# Patient Record
Sex: FEMALE | Race: WHITE | Employment: FULL TIME | URBAN - METROPOLITAN AREA
[De-identification: names, ages, dates, MRNs, and addresses within clinical notes are randomized per-mention and may not be internally consistent; named-entity substitution may affect disease eponyms.]

---

## 2017-06-07 ENCOUNTER — HOSPITAL ENCOUNTER (OUTPATIENT)
Dept: MAMMOGRAPHY | Age: 52
Discharge: HOME | End: 2017-06-07
Attending: INTERNAL MEDICINE
Payer: COMMERCIAL

## 2017-06-07 DIAGNOSIS — Z12.39 SCREENING FOR BREAST CANCER: ICD-10-CM

## 2017-06-07 PROCEDURE — 77063 BREAST TOMOSYNTHESIS BI: CPT | Performed by: INTERNAL MEDICINE

## 2017-06-07 PROCEDURE — 77067 SCR MAMMO BI INCL CAD: CPT | Performed by: INTERNAL MEDICINE

## 2017-10-09 ENCOUNTER — LAB ENCOUNTER (OUTPATIENT)
Dept: LAB | Age: 52
End: 2017-10-09
Attending: INTERNAL MEDICINE
Payer: COMMERCIAL

## 2017-10-09 ENCOUNTER — OFFICE VISIT (OUTPATIENT)
Dept: FAMILY MEDICINE CLINIC | Facility: CLINIC | Age: 52
End: 2017-10-09

## 2017-10-09 VITALS
TEMPERATURE: 98 F | WEIGHT: 128 LBS | RESPIRATION RATE: 16 BRPM | DIASTOLIC BLOOD PRESSURE: 68 MMHG | HEART RATE: 78 BPM | SYSTOLIC BLOOD PRESSURE: 110 MMHG | HEIGHT: 62 IN | BODY MASS INDEX: 23.55 KG/M2

## 2017-10-09 DIAGNOSIS — E03.9 HYPOTHYROIDISM, UNSPECIFIED TYPE: ICD-10-CM

## 2017-10-09 DIAGNOSIS — Z00.00 LABORATORY EXAMINATION ORDERED AS PART OF A ROUTINE GENERAL MEDICAL EXAMINATION: ICD-10-CM

## 2017-10-09 DIAGNOSIS — Z00.00 ANNUAL PHYSICAL EXAM: Primary | ICD-10-CM

## 2017-10-09 PROCEDURE — 83735 ASSAY OF MAGNESIUM: CPT

## 2017-10-09 PROCEDURE — 84439 ASSAY OF FREE THYROXINE: CPT

## 2017-10-09 PROCEDURE — 82306 VITAMIN D 25 HYDROXY: CPT

## 2017-10-09 PROCEDURE — 36415 COLL VENOUS BLD VENIPUNCTURE: CPT

## 2017-10-09 PROCEDURE — 85025 COMPLETE CBC W/AUTO DIFF WBC: CPT

## 2017-10-09 PROCEDURE — 84443 ASSAY THYROID STIM HORMONE: CPT

## 2017-10-09 PROCEDURE — 80061 LIPID PANEL: CPT

## 2017-10-09 PROCEDURE — 80053 COMPREHEN METABOLIC PANEL: CPT

## 2017-10-09 PROCEDURE — 99396 PREV VISIT EST AGE 40-64: CPT | Performed by: INTERNAL MEDICINE

## 2017-10-09 PROCEDURE — 82607 VITAMIN B-12: CPT

## 2017-10-09 NOTE — PROGRESS NOTES
REASON FOR VISIT:    Jarvis Parish is a 47year old female who presents for an [address]. Feeling warm all the time.    Colonoscopy at the end of this month   Has been more cramping in her legs since cutting back on bananas     Trudi Sleep: still struggles with sleep issues/ wakes up at 3 am and feels tired all the time    If you are a male age 38-65 or a female age 47-67, do you take aspirin?: No    Have you had any immunizations at another office such as Influenza, Hepatitis B, Tet GONOCOCCUS    HIV Screening For all adults age 22-65, older adults at increased risk No results found for: HIV    Syphilis Screening Screen if pregnant or high risk No results found for: RPR    Hepatitis C Screening Screen those at high risk plus screen on colon      SOCIAL HISTORY:   Smoking status: Never Smoker                                                              Smokeless tobacco: Never Used                      Alcohol use:  Yes              Comment: occasionally    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

## 2017-10-10 RX ORDER — LEVOTHYROXINE SODIUM 0.07 MG/1
TABLET ORAL
Qty: 90 TABLET | Refills: 1 | Status: SHIPPED | OUTPATIENT
Start: 2017-10-10 | End: 2018-06-06

## 2017-10-10 NOTE — TELEPHONE ENCOUNTER
Requesting Levothyroxine 75mcg daily  LOV: 10/9/17  RTC: 1 year  Last Labs: 10/9/17  Filled: 10/6/16 #90 with 3 refills    Future Appointments  Date Time Provider Silvina Rosemary   10/23/2017 9:00 AM Barbie Harrison MD [address]

## 2017-10-23 PROCEDURE — 88305 TISSUE EXAM BY PATHOLOGIST: CPT | Performed by: INTERNAL MEDICINE

## 2018-01-18 ENCOUNTER — OFFICE VISIT (OUTPATIENT)
Dept: FAMILY MEDICINE CLINIC | Facility: CLINIC | Age: 53
End: 2018-01-18

## 2018-01-18 VITALS
RESPIRATION RATE: 18 BRPM | WEIGHT: 130 LBS | BODY MASS INDEX: 24 KG/M2 | OXYGEN SATURATION: 97 % | TEMPERATURE: 98 F | HEART RATE: 88 BPM | SYSTOLIC BLOOD PRESSURE: 120 MMHG | DIASTOLIC BLOOD PRESSURE: 70 MMHG

## 2018-01-18 DIAGNOSIS — J02.9 SORE THROAT: ICD-10-CM

## 2018-01-18 DIAGNOSIS — J02.9 ACUTE VIRAL PHARYNGITIS: Primary | ICD-10-CM

## 2018-01-18 LAB — CONTROL LINE PRESENT WITH A CLEAR BACKGROUND (YES/NO): YES YES/NO

## 2018-01-18 PROCEDURE — 87880 STREP A ASSAY W/OPTIC: CPT | Performed by: NURSE PRACTITIONER

## 2018-01-18 PROCEDURE — 99213 OFFICE O/P EST LOW 20 MIN: CPT | Performed by: NURSE PRACTITIONER

## 2018-01-18 NOTE — PROGRESS NOTES
CHIEF COMPLAINT:   Patient presents with:  Sore Throat: pt c\o of sore throat, x 3days         HPI:   Juan Manuel Carrillo is a 47year old female presents to clinic with complaint of sore throat. Patient has had for 7 days.  Symptoms have been worsening s NOSE: nostrils patent, no nasal discharge, nasal mucosa pink  THROAT: oral mucosa pink, moist. Posterior pharynx is slightly erythematous and injected. no exudates. Tonsils 2/4. Breath non malodorous. Uvula midline, able to swallowing. No abscess noted. · If symptoms are severe, you or your child should rest at home. Return to work or school when you or your child feel well enough.   · You or your child should drink plenty of fluids to prevent dehydration.   · Use throat lozenges or numbing throat sprays t · New or worsening ear pain, sinus pain, or headache  · Painful lumps in the back of neck  · Stiff neck  · Lymph nodes are getting larger  · Can’t swallow liquids, a lot of drooling, or can’t open mouth wide due to throat pain  · Signs of dehydration, such · Ease pain with anesthetic sprays. Aspirin or an aspirin substitute also helps.  Remember, never give aspirin to anyone 25 or younger, or if you are already taking blood thinners.   · For sore throats caused by allergies, try antihistamines to block the al

## 2018-01-18 NOTE — PATIENT INSTRUCTIONS
May toggle back and forth every 4 hours between 2 tablets of 325mg tylenol and 2 tablets of ibuprofen. Viral Pharyngitis (Sore Throat)    You or your child have pharyngitis (sore throat). This infection is caused by a virus.  It can cause throat pain caroline Follow up with a healthcare provider or our staff if you or your child are not getting better over the next week.   When to seek medical advice  Call your healthcare provider right away if any of these occur:  · Fever as directed by your healthcare provider · Suck on throat lozenges, cough drops, hard candy, ice chips, or frozen fruit-juice bars. Use the sugar-free versions if your diet or medical condition requires them. Gargle to ease irritation  Gargling every hour or 2 can ease irritation.  Try gargling w

## 2018-03-15 PROBLEM — M75.102 NONTRAUMATIC TEAR OF LEFT SUPRASPINATUS TENDON: Status: ACTIVE | Noted: 2018-03-15

## 2018-03-15 PROBLEM — M25.512 CHRONIC LEFT SHOULDER PAIN: Status: ACTIVE | Noted: 2018-03-15

## 2018-03-15 PROBLEM — G89.29 CHRONIC LEFT SHOULDER PAIN: Status: ACTIVE | Noted: 2018-03-15

## 2018-03-15 PROBLEM — M75.42 IMPINGEMENT SYNDROME OF LEFT SHOULDER: Status: ACTIVE | Noted: 2018-03-15

## 2018-04-23 PROBLEM — M75.02 ADHESIVE CAPSULITIS OF LEFT SHOULDER: Status: ACTIVE | Noted: 2018-04-23

## 2018-04-23 PROBLEM — M75.112 NONTRAUMATIC INCOMPLETE TEAR OF LEFT ROTATOR CUFF: Status: ACTIVE | Noted: 2018-04-23

## 2018-05-30 PROCEDURE — 86376 MICROSOMAL ANTIBODY EACH: CPT | Performed by: INTERNAL MEDICINE

## 2018-05-30 PROCEDURE — 84480 ASSAY TRIIODOTHYRONINE (T3): CPT | Performed by: INTERNAL MEDICINE

## 2018-05-30 PROCEDURE — 86800 THYROGLOBULIN ANTIBODY: CPT | Performed by: INTERNAL MEDICINE

## 2018-06-04 RX ORDER — LEVOTHYROXINE SODIUM 0.07 MG/1
TABLET ORAL
Qty: 90 TABLET | Refills: 0 | OUTPATIENT
Start: 2018-06-04

## 2018-06-04 NOTE — TELEPHONE ENCOUNTER
No longer our patient - denied     LEVOTHYROXINE 0.075MG (75MCG) TABS  Will file in chart as: LEVOTHYROXINE SODIUM 75 MCG Oral Tab  TAKE 1 TABLET BY MOUTH DAILY       Disp: 90 tablet Refills: 0    Class: Normal Start: 6/4/2018   Documented:4 years ago  [facility]

## 2018-06-06 RX ORDER — LEVOTHYROXINE SODIUM 0.07 MG/1
TABLET ORAL
Qty: 90 TABLET | Refills: 0 | Status: SHIPPED | OUTPATIENT
Start: 2018-06-06 | End: 2018-08-14

## 2018-06-06 NOTE — TELEPHONE ENCOUNTER
Requesting LEVOTHYROXINE 0.075MG (75MCG) TABS  Thyroid Supplements Protocol Passed  LOV:  10/9/17  RTC: 1 yr  Last Labs: 5/30/18  Filled: 10/10/17 #90 with 1 refills  Approved PP  Future Appointments  Date Time Provider Silvina Riley   6/7/2018 2:15 PM

## 2018-08-14 PROBLEM — E55.9 VITAMIN D DEFICIENCY: Status: ACTIVE | Noted: 2018-08-14

## 2018-09-04 RX ORDER — LEVOTHYROXINE SODIUM 0.07 MG/1
TABLET ORAL
Qty: 90 TABLET | Refills: 0 | OUTPATIENT
Start: 2018-09-04

## 2018-10-24 PROBLEM — J01.00 ACUTE MAXILLARY SINUSITIS, RECURRENCE NOT SPECIFIED: Status: ACTIVE | Noted: 2018-10-24

## 2018-10-24 PROBLEM — H10.13 ALLERGIC RHINOCONJUNCTIVITIS OF BOTH EYES: Status: ACTIVE | Noted: 2018-10-24

## 2018-10-24 PROBLEM — J06.9 ACUTE URI: Status: ACTIVE | Noted: 2018-10-24

## 2018-10-24 PROBLEM — Z12.39 SCREENING FOR BREAST CANCER: Status: ACTIVE | Noted: 2018-10-24

## 2018-10-24 PROBLEM — J30.9 ALLERGIC RHINOCONJUNCTIVITIS OF BOTH EYES: Status: ACTIVE | Noted: 2018-10-24

## 2019-12-23 PROBLEM — J30.9 ALLERGIC RHINOCONJUNCTIVITIS OF BOTH EYES: Status: RESOLVED | Noted: 2018-10-24 | Resolved: 2019-12-23

## 2019-12-23 PROBLEM — J01.00 ACUTE MAXILLARY SINUSITIS, RECURRENCE NOT SPECIFIED: Status: RESOLVED | Noted: 2018-10-24 | Resolved: 2019-12-23

## 2019-12-23 PROBLEM — M75.112 NONTRAUMATIC INCOMPLETE TEAR OF LEFT ROTATOR CUFF: Status: RESOLVED | Noted: 2018-04-23 | Resolved: 2019-12-23

## 2019-12-23 PROBLEM — M75.102 NONTRAUMATIC TEAR OF LEFT SUPRASPINATUS TENDON: Status: RESOLVED | Noted: 2018-03-15 | Resolved: 2019-12-23

## 2019-12-23 PROBLEM — M75.42 IMPINGEMENT SYNDROME OF LEFT SHOULDER: Status: RESOLVED | Noted: 2018-03-15 | Resolved: 2019-12-23

## 2019-12-23 PROBLEM — J06.9 ACUTE URI: Status: RESOLVED | Noted: 2018-10-24 | Resolved: 2019-12-23

## 2019-12-23 PROBLEM — H10.13 ALLERGIC RHINOCONJUNCTIVITIS OF BOTH EYES: Status: RESOLVED | Noted: 2018-10-24 | Resolved: 2019-12-23

## 2020-02-10 PROBLEM — F51.05 MOOD INSOMNIA (HCC): Status: ACTIVE | Noted: 2020-02-10

## 2020-02-10 PROBLEM — F41.8 DEPRESSION WITH ANXIETY: Status: ACTIVE | Noted: 2020-02-10

## 2020-02-10 PROBLEM — F39 MOOD INSOMNIA (HCC): Status: ACTIVE | Noted: 2020-02-10
